# Patient Record
Sex: FEMALE | Race: WHITE | Employment: FULL TIME | URBAN - METROPOLITAN AREA
[De-identification: names, ages, dates, MRNs, and addresses within clinical notes are randomized per-mention and may not be internally consistent; named-entity substitution may affect disease eponyms.]

---

## 2018-06-14 PROBLEM — M25.562 ACUTE PAIN OF LEFT KNEE: Status: ACTIVE | Noted: 2018-06-14

## 2024-02-16 RX ORDER — ESCITALOPRAM OXALATE 20 MG/1
20 TABLET ORAL NIGHTLY
COMMUNITY

## 2024-02-19 NOTE — DISCHARGE INSTRUCTIONS
Home Care Instructions Following Your ACL Reconstruction     We hope you were pleased with your care at [facility]. We wish you the best outcome and overall experience with your operation. These instructions will help to minimize your pain and improve the likelihood of a successful result.    Weight bearing status  You will be 50% weight bearing on your operative extremity for the next 4 weeks.  You will use crutches for ambulation assistance while 50% weight bearing.  Crutches will be provided to you from the surgery center if you do not already have a pair    Brace instructions  Remain in your brace at all times for the next 4 weeks.  You may remove your brace for showering and physical therapy only.  Your brace is currently locked in extension (your leg is kept straight in your brace). You will remain locked in extension in your brace while ambulating for the first 2-4 weeks.    Bandages (Dressing)  Keep dressing clean and dry.  Dr. Gipson's office staff will remove your dressings at your first post-operative appointment.  Do not get your bandage wet.  You can shower with a plastic bag over your bandage. Keep bandage dry.    Wound Care  The following instructions will promote proper healing and help to prevent infection.  After our office staff has removed your bandage at your first post-operative appointment:   You can shower and get the incisions wet. Pat dry after your shower.  The white strips of tape covering your incisions are called steri-strips, and these will fall off after 1-2 weeks of showering. The steri-strips can get wet.  There are no sutures to remove, the sutures are all underneath the skin and dissolvable.  Do not submerge your knee in water (bath tub, hot tub, swimming pool, etc) until at least 4 weeks post operatively.     Cold Therapy  Cold therapy will help minimize swelling, improve, comfort, and limit the need for pain medication.  Apply an ice pack 2-3 times daily  The ice bag  should never come in direct contact with the skin, always have a towel or cloth in between the ice pack and your skin  Immediately contact Dr. Gipson if you experience excruciating pain not helped by pain medication, burning, blisters, redness, discoloration, or other changes in skin appearance.    Pain Medication: Norco  Norco: Take one or two tablets every six hours as needed for pain.  Do not exceed 8 (eight) tablets each day.  Do not take Norco, if you do not have pain.  You may take NSAIDs (Advil, Aleve) to assist with pain control if the Norco is not giving you maximum pain relief. It is okay to take NSAIDs with Norco.  You should not take Tylenol with Norco, as Norco already contains Tylenol, and too much Tylenol can be very dangerous for your body.    Deep Vein Thrombosis (DVT) Prophylaxis  You are at increased risk for developing a DVT or lower extremity blood clot after a lower extremity surgery.   Signs and symptoms of a DVT include calf swelling, calf pain, and calf tenderness to palpation.  If you experience severe calf pain, calf tenderness or calf swelling, you should go to the nearest urgent care or emergency room immediately for a STAT ultrasound to assess for a blood clot.  Keys to prevention of  DVT are performing ankle pumps (moving your ankle in an up and down motion) multiple times throughout the day for the first 1-2 weeks post-op.  To also help prevent a blood clot from developing, take 1 tablet of baby aspirin (81mg) daily for 4 weeks post operatively    Home Medication  Resume your home medications as instructed.    Diet  Resume your normal diet.    Activity  No strenuous activity. Again, you will be 50% weight bearing post-operatively for 4 weeks.   Use your crutches while 50% weight bearing and while using your brace.  You can go up and down the stairs as tolerated while in your brace. Use common sense.  You cannot return to work if your work requires strenuous activity with your legs.    You cannot return to any sports or exercises involving your legs until Dr. Gipson gives you permission.    Driving  Do not drive if you are taking pain medication. Do not drive while in the brace and using crutches.    Follow-up Appointment with Dr. Gipson  You were likely given a postoperative appointment with Dr. Gipson or Светлана WALLER at the time your operation was scheduled.  Call Dr. Gipson's office today to verify your appointment date, time, and location. You should be seen 2-5 days after your surgery. Call either [phone] for your appointment  At your 1st postoperative office visit: Your wounds will be evaluated, healing assessed, physical therapy will be ordered and scheduled and any additional concerns and instructions will be discussed.    Questions or Concerns  Call Dr. Gipson's office if you experience severe pain not controlled by pain medication, swelling, numbness, tingling, bleeding, fever, or other concerns.   If your call is made after office hours, a physician assistant or nurse practitioner will be available to help you. There is always a surgeon covering Dr. Gipson's patients, if he is unavailable.    Thank you for coming to [facility] for your operation. The nurses and the anesthesiologist try very hard to make sure you receive the best care possible.  Your trust in them is greatly appreciated.    Thanks so much,  Dr. Gipson       HOME INSTRUCTIONS  AMBSURG HOME CARE INSTRUCTIONS: POST-OP ANESTHESIA  The medication that you received for sedation or general anesthesia can last up to 24 hours. Your judgment and reflexes may be altered, even if you feel like your normal self.      We Recommend:   Do not drive any motor vehicle or bicycle   Avoid mowing the lawn, playing sports, or working with power tools/applicances (power saws, electric knives or mixers)   That you have someone stay with you on your first night home   Do not drink alcohol or take sleeping pills or  tranquilizers   Do not sign legal documents within 24 hours of your procedure   If you had a nerve block for your surgery, take extra care not to put any pressure on your arm or hand for 24 hours    It is normal:  For you to have a sore throat if you had a breathing tube during surgery (while you were asleep!). The sore throat should get better within 48 hours. You can gargle with warm salt water (1/2 tsp in 4 oz warm water) or use a throat lozenge for comfort  To feel muscle aches or soreness especially in the abdomen, chest or neck. The achy feeling should go away in the next 24 hours  To feel weak, sleepy or \"wiped out\". Your should start feeling better in the next 24 hours.   To experience mild discomforts such as sore lip or tongue, headache, cramps, gas pains or a bloated feeling in your abdomen.   To experience mild back pain or soreness for a day or two if you had spinal or epidural anesthesia.   If you had laparoscopic surgery, to feel shoulder pain or discomfort on the day of surgery.   For some patients to have nausea after surgery/anesthesia    If you feel nausea or experience vomiting:   Try to move around less.   Eat less than usual or drink only liquids until the next morning   Nausea should resolve in about 24 hours    If you have a problem when you are at home:    Call your surgeons office   Discharge Instructions: After Your Surgery  You’ve just had surgery. During surgery, you were given medicine called anesthesia to keep you relaxed and free of pain. After surgery, you may have some pain or nausea. This is common. Here are some tips for feeling better and getting well after surgery.   Going home  Your healthcare provider will show you how to take care of yourself when you go home. They'll also answer your questions. Have an adult family member or friend drive you home. For the first 24 hours after your surgery:   Don't drive or use heavy equipment.  Don't make important decisions or sign legal  papers.  Take medicines as directed.  Don't drink alcohol.  Have someone stay with you, if needed. They can watch for problems and help keep you safe.  Be sure to go to all follow-up visits with your healthcare provider. And rest after your surgery for as long as your provider tells you to.   Coping with pain  If you have pain after surgery, pain medicine will help you feel better. Take it as directed, before pain becomes severe. Also, ask your healthcare provider or pharmacist about other ways to control pain. This might be with heat, ice, or relaxation. And follow any other instructions your surgeon or nurse gives you.      Stay on schedule with your medicine.     Tips for taking pain medicine  To get the best relief possible, remember these points:   Pain medicines can upset your stomach. Taking them with a little food may help.  Most pain relievers taken by mouth need at least 20 to 30 minutes to start to work.  Don't wait till your pain becomes severe before you take your medicine. Try to time your medicine so that you can take it before starting an activity. This might be before you get dressed, go for a walk, or sit down for dinner.  Constipation is a common side effect of some pain medicines. Call your healthcare provider before taking any medicines such as laxatives or stool softeners to help ease constipation. Also ask if you should skip any foods. Drinking lots of fluids and eating foods such as fruits and vegetables that are high in fiber can also help. Remember, don't take laxatives unless your surgeon has prescribed them.  Drinking alcohol and taking pain medicine can cause dizziness and slow your breathing. It can even be deadly. Don't drink alcohol while taking pain medicine.  Pain medicine can make you react more slowly to things. Don't drive or run machinery while taking pain medicine.  Your healthcare provider may tell you to take acetaminophen to help ease your pain. Ask them how much you're  supposed to take each day. Acetaminophen or other pain relievers may interact with your prescription medicines or other over-the-counter (OTC) medicines. Some prescription medicines have acetaminophen and other ingredients in them. Using both prescription and OTC acetaminophen for pain can cause you to accidentally overdose. Read the labels on your OTC medicines with care. This will help you to clearly know the list of ingredients, how much to take, and any warnings. It may also help you not take too much acetaminophen. If you have questions or don't understand the information, ask your pharmacist or healthcare provider to explain it to you before you take the OTC medicine.   Managing nausea  Some people have an upset stomach (nausea) after surgery. This is often because of anesthesia, pain, or pain medicine, less movement of food in the stomach, or the stress of surgery. These tips will help you handle nausea and eat healthy foods as you get better. If you were on a special food plan before surgery, ask your healthcare provider if you should follow it while you get better. Check with your provider on how your eating should progress. It may depend on the surgery you had. These general tips may help:   Don't push yourself to eat. Your body will tell you when to eat and how much.  Start off with clear liquids and soup. They're easier to digest.  Next try semi-solid foods as you feel ready. These include mashed potatoes, applesauce, and gelatin.  Slowly move to solid foods. Don’t eat fatty, rich, or spicy foods at first.  Don't force yourself to have 3 large meals a day. Instead eat smaller amounts more often.  Take pain medicines with a small amount of solid food, such as crackers or toast. This helps prevent nausea.  When to call your healthcare provider  Call your healthcare provider right away if you have any of these:   You still have too much pain, or the pain gets worse, after taking the medicine. The medicine  may not be strong enough. Or there may be a complication from the surgery.  You feel too sleepy, dizzy, or groggy. The medicine may be too strong.  Side effects such as nausea or vomiting. Your healthcare provider may advise taking other medicines to .  Skin changes such as rash, itching, or hives. This may mean you have an allergic reaction. Your provider may advise taking other medicines.  The incision looks different (for instance, part of it opens up).  Bleeding or fluid leaking from the incision site, and weren't told to expect that.  Fever of 100.4°F (38°C) or higher, or as directed by your provider.  Call 911  Call 911 right away if you have:   Trouble breathing  Facial swelling    If you have obstructive sleep apnea   You were given anesthesia medicine during surgery to keep you comfortable and free of pain. After surgery, you may have more apnea spells because of this medicine and other medicines you were given. The spells may last longer than normal.    At home:  Keep using the continuous positive airway pressure (CPAP) device when you sleep. Unless your healthcare provider tells you not to, use it when you sleep, day or night. CPAP is a common device used to treat obstructive sleep apnea.  Talk with your provider before taking any pain medicine, muscle relaxants, or sedatives. Your provider will tell you about the possible dangers of taking these medicines.  Contact your provider if your sleeping changes a lot even when taking medicines as directed.  Carl last reviewed this educational content on 10/1/2021  © 6186-1521 [employer]. All rights reserved. This information is not intended as a substitute for professional medical care. Always follow your healthcare professional's instructions.

## 2024-02-20 ENCOUNTER — ANESTHESIA EVENT (OUTPATIENT)
Dept: SURGERY | Facility: HOSPITAL | Age: 29
End: 2024-02-20
Payer: OTHER MISCELLANEOUS

## 2024-02-21 ENCOUNTER — HOSPITAL ENCOUNTER (OUTPATIENT)
Facility: HOSPITAL | Age: 29
Setting detail: HOSPITAL OUTPATIENT SURGERY
Discharge: HOME | End: 2024-02-21
Attending: ORTHOPAEDIC SURGERY | Admitting: ORTHOPAEDIC SURGERY
Payer: OTHER MISCELLANEOUS

## 2024-02-21 ENCOUNTER — APPOINTMENT (OUTPATIENT)
Dept: GENERAL RADIOLOGY | Facility: HOSPITAL | Age: 29
End: 2024-02-21
Attending: ORTHOPAEDIC SURGERY
Payer: OTHER MISCELLANEOUS

## 2024-02-21 ENCOUNTER — ANESTHESIA (OUTPATIENT)
Dept: SURGERY | Facility: HOSPITAL | Age: 29
End: 2024-02-21
Payer: OTHER MISCELLANEOUS

## 2024-02-21 VITALS
DIASTOLIC BLOOD PRESSURE: 66 MMHG | RESPIRATION RATE: 16 BRPM | TEMPERATURE: 98 F | HEIGHT: 65.5 IN | HEART RATE: 66 BPM | WEIGHT: 285 LBS | OXYGEN SATURATION: 100 % | SYSTOLIC BLOOD PRESSURE: 129 MMHG | BODY MASS INDEX: 46.91 KG/M2

## 2024-02-21 DIAGNOSIS — S83.502D SPRAIN OF CRUCIATE LIGAMENT OF LEFT KNEE, SUBSEQUENT ENCOUNTER: Primary | ICD-10-CM

## 2024-02-21 LAB — B-HCG UR QL: NEGATIVE

## 2024-02-21 PROCEDURE — 81025 URINE PREGNANCY TEST: CPT

## 2024-02-21 PROCEDURE — 0SBD4ZZ EXCISION OF LEFT KNEE JOINT, PERCUTANEOUS ENDOSCOPIC APPROACH: ICD-10-PCS | Performed by: ORTHOPAEDIC SURGERY

## 2024-02-21 PROCEDURE — 64447 NJX AA&/STRD FEMORAL NRV IMG: CPT | Performed by: ORTHOPAEDIC SURGERY

## 2024-02-21 PROCEDURE — 0MRP4KZ REPLACEMENT OF LEFT KNEE BURSA AND LIGAMENT WITH NONAUTOLOGOUS TISSUE SUBSTITUTE, PERCUTANEOUS ENDOSCOPIC APPROACH: ICD-10-PCS | Performed by: ORTHOPAEDIC SURGERY

## 2024-02-21 PROCEDURE — 76000 FLUOROSCOPY <1 HR PHYS/QHP: CPT | Performed by: ORTHOPAEDIC SURGERY

## 2024-02-21 PROCEDURE — 0SQD4ZZ REPAIR LEFT KNEE JOINT, PERCUTANEOUS ENDOSCOPIC APPROACH: ICD-10-PCS | Performed by: ORTHOPAEDIC SURGERY

## 2024-02-21 DEVICE — IMPLANTABLE DEVICE: Type: IMPLANTABLE DEVICE | Site: KNEE | Status: FUNCTIONAL

## 2024-02-21 DEVICE — SCREW INTFR L30MM DIA10MM VENT BIOCOMPOSITE: Type: IMPLANTABLE DEVICE | Site: KNEE | Status: FUNCTIONAL

## 2024-02-21 RX ORDER — MIDAZOLAM HYDROCHLORIDE 1 MG/ML
INJECTION INTRAMUSCULAR; INTRAVENOUS
Status: COMPLETED | OUTPATIENT
Start: 2024-02-21 | End: 2024-02-21

## 2024-02-21 RX ORDER — HYDROMORPHONE HYDROCHLORIDE 1 MG/ML
0.2 INJECTION, SOLUTION INTRAMUSCULAR; INTRAVENOUS; SUBCUTANEOUS EVERY 5 MIN PRN
Status: DISCONTINUED | OUTPATIENT
Start: 2024-02-21 | End: 2024-02-21

## 2024-02-21 RX ORDER — ONDANSETRON 2 MG/ML
INJECTION INTRAMUSCULAR; INTRAVENOUS AS NEEDED
Status: DISCONTINUED | OUTPATIENT
Start: 2024-02-21 | End: 2024-02-21 | Stop reason: SURG

## 2024-02-21 RX ORDER — ASPIRIN 81 MG/1
81 TABLET ORAL DAILY
Qty: 30 TABLET | Refills: 0 | Status: SHIPPED | OUTPATIENT
Start: 2024-02-21

## 2024-02-21 RX ORDER — CEFAZOLIN SODIUM/WATER 2 G/20 ML
SYRINGE (ML) INTRAVENOUS AS NEEDED
Status: DISCONTINUED | OUTPATIENT
Start: 2024-02-21 | End: 2024-02-21 | Stop reason: SURG

## 2024-02-21 RX ORDER — BUPIVACAINE HYDROCHLORIDE AND EPINEPHRINE 2.5; 5 MG/ML; UG/ML
INJECTION, SOLUTION INFILTRATION; PERINEURAL AS NEEDED
Status: DISCONTINUED | OUTPATIENT
Start: 2024-02-21 | End: 2024-02-21 | Stop reason: HOSPADM

## 2024-02-21 RX ORDER — GLYCOPYRROLATE 0.2 MG/ML
INJECTION, SOLUTION INTRAMUSCULAR; INTRAVENOUS AS NEEDED
Status: DISCONTINUED | OUTPATIENT
Start: 2024-02-21 | End: 2024-02-21 | Stop reason: SURG

## 2024-02-21 RX ORDER — HYDROCODONE BITARTRATE AND ACETAMINOPHEN 5; 325 MG/1; MG/1
1 TABLET ORAL ONCE
Status: COMPLETED | OUTPATIENT
Start: 2024-02-21 | End: 2024-02-21

## 2024-02-21 RX ORDER — CEFAZOLIN SODIUM IN 0.9 % NACL 3 G/100 ML
3 INTRAVENOUS SOLUTION, PIGGYBACK (ML) INTRAVENOUS ONCE
Status: DISCONTINUED | OUTPATIENT
Start: 2024-02-21 | End: 2024-02-21 | Stop reason: HOSPADM

## 2024-02-21 RX ORDER — IBUPROFEN 600 MG/1
600 TABLET ORAL EVERY 8 HOURS PRN
Qty: 30 TABLET | Refills: 1 | Status: SHIPPED | OUTPATIENT
Start: 2024-02-21

## 2024-02-21 RX ORDER — DEXAMETHASONE SODIUM PHOSPHATE 4 MG/ML
VIAL (ML) INJECTION AS NEEDED
Status: DISCONTINUED | OUTPATIENT
Start: 2024-02-21 | End: 2024-02-21 | Stop reason: SURG

## 2024-02-21 RX ORDER — MORPHINE SULFATE 4 MG/ML
2 INJECTION, SOLUTION INTRAMUSCULAR; INTRAVENOUS EVERY 10 MIN PRN
Status: DISCONTINUED | OUTPATIENT
Start: 2024-02-21 | End: 2024-02-21

## 2024-02-21 RX ORDER — FAMOTIDINE 20 MG/1
20 TABLET, FILM COATED ORAL ONCE
Status: COMPLETED | OUTPATIENT
Start: 2024-02-21 | End: 2024-02-21

## 2024-02-21 RX ORDER — SODIUM CHLORIDE, SODIUM LACTATE, POTASSIUM CHLORIDE, CALCIUM CHLORIDE 600; 310; 30; 20 MG/100ML; MG/100ML; MG/100ML; MG/100ML
INJECTION, SOLUTION INTRAVENOUS CONTINUOUS
Status: DISCONTINUED | OUTPATIENT
Start: 2024-02-21 | End: 2024-02-21

## 2024-02-21 RX ORDER — MORPHINE SULFATE 4 MG/ML
4 INJECTION, SOLUTION INTRAMUSCULAR; INTRAVENOUS EVERY 10 MIN PRN
Status: DISCONTINUED | OUTPATIENT
Start: 2024-02-21 | End: 2024-02-21

## 2024-02-21 RX ORDER — DEXAMETHASONE SODIUM PHOSPHATE 10 MG/ML
INJECTION, SOLUTION INTRAMUSCULAR; INTRAVENOUS
Status: COMPLETED | OUTPATIENT
Start: 2024-02-21 | End: 2024-02-21

## 2024-02-21 RX ORDER — HYDROMORPHONE HYDROCHLORIDE 1 MG/ML
0.4 INJECTION, SOLUTION INTRAMUSCULAR; INTRAVENOUS; SUBCUTANEOUS EVERY 5 MIN PRN
Status: DISCONTINUED | OUTPATIENT
Start: 2024-02-21 | End: 2024-02-21

## 2024-02-21 RX ORDER — ONDANSETRON 2 MG/ML
4 INJECTION INTRAMUSCULAR; INTRAVENOUS EVERY 6 HOURS PRN
Status: DISCONTINUED | OUTPATIENT
Start: 2024-02-21 | End: 2024-02-21

## 2024-02-21 RX ORDER — LIDOCAINE HYDROCHLORIDE 10 MG/ML
INJECTION, SOLUTION INFILTRATION; PERINEURAL
Status: COMPLETED | OUTPATIENT
Start: 2024-02-21 | End: 2024-02-21

## 2024-02-21 RX ORDER — MORPHINE SULFATE 10 MG/ML
6 INJECTION, SOLUTION INTRAMUSCULAR; INTRAVENOUS EVERY 10 MIN PRN
Status: DISCONTINUED | OUTPATIENT
Start: 2024-02-21 | End: 2024-02-21

## 2024-02-21 RX ORDER — NALOXONE HYDROCHLORIDE 0.4 MG/ML
0.08 INJECTION, SOLUTION INTRAMUSCULAR; INTRAVENOUS; SUBCUTANEOUS AS NEEDED
Status: DISCONTINUED | OUTPATIENT
Start: 2024-02-21 | End: 2024-02-21

## 2024-02-21 RX ORDER — ACETAMINOPHEN 500 MG
1000 TABLET ORAL ONCE
Status: COMPLETED | OUTPATIENT
Start: 2024-02-21 | End: 2024-02-21

## 2024-02-21 RX ORDER — ROPIVACAINE HYDROCHLORIDE 5 MG/ML
INJECTION, SOLUTION EPIDURAL; INFILTRATION; PERINEURAL
Status: COMPLETED | OUTPATIENT
Start: 2024-02-21 | End: 2024-02-21

## 2024-02-21 RX ORDER — KETOROLAC TROMETHAMINE 30 MG/ML
INJECTION, SOLUTION INTRAMUSCULAR; INTRAVENOUS AS NEEDED
Status: DISCONTINUED | OUTPATIENT
Start: 2024-02-21 | End: 2024-02-21 | Stop reason: SURG

## 2024-02-21 RX ORDER — FAMOTIDINE 10 MG/ML
20 INJECTION, SOLUTION INTRAVENOUS ONCE
Status: COMPLETED | OUTPATIENT
Start: 2024-02-21 | End: 2024-02-21

## 2024-02-21 RX ORDER — HYDROMORPHONE HYDROCHLORIDE 1 MG/ML
0.6 INJECTION, SOLUTION INTRAMUSCULAR; INTRAVENOUS; SUBCUTANEOUS EVERY 5 MIN PRN
Status: DISCONTINUED | OUTPATIENT
Start: 2024-02-21 | End: 2024-02-21

## 2024-02-21 RX ORDER — HYDROCODONE BITARTRATE AND ACETAMINOPHEN 5; 325 MG/1; MG/1
1 TABLET ORAL EVERY 6 HOURS PRN
Qty: 28 TABLET | Refills: 0 | Status: SHIPPED | OUTPATIENT
Start: 2024-02-21

## 2024-02-21 RX ADMIN — DEXAMETHASONE SODIUM PHOSPHATE 4 MG: 4 MG/ML VIAL (ML) INJECTION at 07:40:00

## 2024-02-21 RX ADMIN — SODIUM CHLORIDE, SODIUM LACTATE, POTASSIUM CHLORIDE, CALCIUM CHLORIDE: 600; 310; 30; 20 INJECTION, SOLUTION INTRAVENOUS at 10:48:00

## 2024-02-21 RX ADMIN — ROPIVACAINE HYDROCHLORIDE 30 ML: 5 INJECTION, SOLUTION EPIDURAL; INFILTRATION; PERINEURAL at 07:21:00

## 2024-02-21 RX ADMIN — GLYCOPYRROLATE 0.2 MG: 0.2 INJECTION, SOLUTION INTRAMUSCULAR; INTRAVENOUS at 07:40:00

## 2024-02-21 RX ADMIN — CEFAZOLIN SODIUM/WATER 3 G: 2 G/20 ML SYRINGE (ML) INTRAVENOUS at 07:34:00

## 2024-02-21 RX ADMIN — DEXAMETHASONE SODIUM PHOSPHATE 10 MG: 10 INJECTION, SOLUTION INTRAMUSCULAR; INTRAVENOUS at 07:21:00

## 2024-02-21 RX ADMIN — KETOROLAC TROMETHAMINE 30 MG: 30 INJECTION, SOLUTION INTRAMUSCULAR; INTRAVENOUS at 10:39:00

## 2024-02-21 RX ADMIN — ONDANSETRON 4 MG: 2 INJECTION INTRAMUSCULAR; INTRAVENOUS at 07:40:00

## 2024-02-21 RX ADMIN — LIDOCAINE HYDROCHLORIDE 5 ML: 10 INJECTION, SOLUTION INFILTRATION; PERINEURAL at 07:21:00

## 2024-02-21 RX ADMIN — MIDAZOLAM HYDROCHLORIDE 2 MG: 1 INJECTION INTRAMUSCULAR; INTRAVENOUS at 07:21:00

## 2024-02-21 NOTE — H&P
History and Physical     Liliana Morrow Patient Status:  Hospital Outpatient Surgery    1995 MRN [id number]   Location [facility] POST ANESTHESIA CARE UNIT Attending Liborio Gipson MD   Hosp Day # 0 PCP No primary care provider on file.     Chief Complaint: left knee instability    Subjective:    Liliana Morrow is a 28 year old female with that sustained left knee ACL graft tear. She has instability. Presents today for L knee ACL revision and LET.     History/Other:      Past Medical History:  Past Medical History:   Diagnosis Date    Anxiety state     Chronic rhinitis     Depression     Hx of motion sickness     Visual impairment         Past Surgical History:   Past Surgical History:   Procedure Laterality Date    KNEE SCOPE,AID ANT CRUCIATE REPAIR Left 2015    Procedure: ARTHROSCOPY KNEE LEFT;  Surgeon: Liborio Gipson MD;  Location: [facility]    KNEE SCOPE,MED+LAT MENIS REPAIR Left 2015    Procedure: ARTHROSCOPY KNEE ANTERIOR CRUCIATE LIGAMENT RECONSTRUCTION;  Surgeon: Liborio Gipson MD;  Location: [facility]    KNEE SURGERY Left 2018    knee arthroscopy with ACL reconstruction/PMM       Social History:  reports that she has never smoked. She has never used smokeless tobacco. She reports current alcohol use. She reports that she does not use drugs.    Family History:   Family History   Problem Relation Age of Onset    Lipids Father     Hypertension Father     Lipids Mother     Hypertension Mother     Diabetes Mother     Heart Disease Maternal Grandmother        Allergies: No Known Allergies    Medications:    No current facility-administered medications on file prior to encounter.     Current Outpatient Medications on File Prior to Encounter   Medication Sig Dispense Refill    escitalopram 20 MG Oral Tab Take 1 tablet (20 mg total) by mouth at bedtime.      Melatonin 2.5 MG Oral Chew Tab Chew by mouth nightly.      Levocetirizine Dihydrochloride  2.5 MG/5ML Oral Solution Take 5 mL (2.5 mg total) by mouth every evening.         Review of Systems:   A comprehensive 14 point review of systems was completed.    Pertinent positives and negatives noted in the HPI.    Objective:     /82   Pulse 69   Temp 98.1 °F (36.7 °C) (Oral)   Resp 20   Ht 5' 5.5\" (1.664 m)   Wt 285 lb (129.3 kg)   LMP 01/24/2024   SpO2 96%   BMI 46.71 kg/m²   General: No acute distress.  Alert ,      , morbidly obese  HEENT: Normocephalic atraumatic. Moist mucous membranes. EOM-I. PERRLA. Anicteric.  Neck: No lymphadenopathy. No JVD. No carotid bruits.  Respiratory: Clear to auscultation bilaterally. No wheezes. No rhonchi.    Cardiovascular: S1, S2. Regular rate and rhythm. No murmurs, rubs or gallops. Equal pulses.   Chest and Back: No tenderness or deformity.  Abdomen: Soft, nontender, nondistended.  Positive bowel sounds. No rebound, guarding or organomegaly.  Neurologic: No focal neurological deficits. CNII-XII grossly intact.  Musculoskeletal: Moves all extremities.  Psychiatric: Appropriate mood and affect.  Integument: No rashes or lesions.   LLE:  +Lachman, +Pivot  -Rose's      Assessment & Plan:    28F that sustained left ACL graft tear.   To OR today for L ACL revision recon with allograft and LET.    Quality:  DVT Mechanical Prophylaxis:        DVT Pharmacologic Prophylaxis   Medication   None                Code Status: Not on file  Titus: No urinary catheter in place  Titus Duration (in days):   Central line:    SIMONA:       Liborio Gipson MD  2/21/2024

## 2024-02-21 NOTE — ANESTHESIA PROCEDURE NOTES
Airway  Date/Time: 2/21/2024 7:41 AM  Urgency: elective    Airway not difficult    General Information and Staff    Patient location during procedure: OR  Anesthesiologist: Xavi Esparza MD  Performed: anesthesiologist   Performed by: Xavi Esparza MD  Authorized by: Xavi Esparza MD      Indications and Patient Condition  Indications for airway management: anesthesia  Spontaneous Ventilation: absent  Sedation level: deep  Preoxygenated: yes  Patient position: sniffing  Mask difficulty assessment: 1 - vent by mask  Planned trial extubation    Final Airway Details  Final airway type: supraglottic airway      Successful airway: classic  Size 4       Number of attempts at approach: 1  Number of other approaches attempted: 0

## 2024-02-21 NOTE — ANESTHESIA POSTPROCEDURE EVALUATION
Patient: Liliana Morrow    Procedure Summary       Date: 02/21/24 Room / Location: [facility] MAIN OR  / [facility] MAIN OR    Anesthesia Start: 0733 Anesthesia Stop:     Procedure: Left knee arthroscopy, anterior cruciate ligament revision reconstruction with tibialis anterior allograft, partial medial meniscectomy, lateral extra-articular tenodesis (Left: Knee) Diagnosis: (Acute medial meniscus tear left, initial encounter, tear of anterior cruciate ligament graft, initial encounter)    Surgeons: Liborio Gipson MD Anesthesiologist: Angela Lugo MD    Anesthesia Type: general, regional ASA Status: 3            Anesthesia Type: No value filed.    Vitals Value Taken Time   /89 02/21/24 1047   Temp 99 °F (37.2 °C) 02/21/24 1047   Pulse 97 02/21/24 1047   Resp 20 02/21/24 1047   SpO2 98 % 02/21/24 1047   Vitals shown include unfiled device data.    EM AN Post Evaluation:   Patient Evaluated in PACU  Patient Participation: complete - patient participated  Level of Consciousness: awake  Pain Score: 3  Pain Management: adequate  Airway Patency:patent  Dental exam unchanged from preop  Yes    Cardiovascular Status: acceptable  Respiratory Status: acceptable  Postoperative Hydration acceptable      ANGELA LUGO MD  2/21/2024 10:48 AM

## 2024-02-21 NOTE — ANESTHESIA PREPROCEDURE EVALUATION
Anesthesia PreOp Note    HPI:     Liliana Morrow is a 28 year old female who presents for preoperative consultation requested by: Liborio Gipson MD    Date of Surgery: 2/21/2024    Procedure(s):  Left knee arthroscopy, anterior cruciate ligament revision reconstruction with tibialis anterior allograft, partial medial meniscectomy, lateral extra-articular tenodesis  Indication: Acute medial meniscus tear left, initial encounter, tear of anterior cruciate ligament graft, initial encounter    Relevant Problems   No relevant active problems       NPO:  Last Liquid Consumption Date: 02/20/24  Last Liquid Consumption Time: 2000  Last Solid Consumption Date: 02/20/24  Last Solid Consumption Time: 2000  Last Liquid Consumption Date: 02/20/24          History Review:  Patient Active Problem List    Diagnosis Date Noted    Acute pain of left knee 06/14/2018    Sprain of cruciate ligament of left knee, subsequent encounter 10/11/2015    Sprain of cruciate ligament of knee, left, subsequent encounter 10/01/2015    Sprain of cruciate ligament of knee 01/12/2015    SX.1/28/15, CPT.79246, L Knee Scope/MMR/PMM/PLM/LMR/ACL Reconstruction, GHD, Global Exp.4/27/15 01/12/2015    Tear of lateral cartilage or meniscus of knee, current 01/12/2015    Sprain of calcaneofibular (ligament) of ankle 12/23/2013       Past Medical History:   Diagnosis Date    Anxiety state     Chronic rhinitis     Depression     Hx of motion sickness     Visual impairment        Past Surgical History:   Procedure Laterality Date    KNEE SCOPE,AID ANT CRUCIATE REPAIR Left 1/28/2015    Procedure: ARTHROSCOPY KNEE LEFT;  Surgeon: Liborio Gipson MD;  Location: [facility]    KNEE SCOPE,MED+LAT MENIS REPAIR Left 1/28/2015    Procedure: ARTHROSCOPY KNEE ANTERIOR CRUCIATE LIGAMENT RECONSTRUCTION;  Surgeon: Liborio Gipson MD;  Location: [facility]    KNEE SURGERY Left 06/04/2018    knee arthroscopy with ACL reconstruction/PMM        Medications Prior to Admission   Medication Sig Dispense Refill Last Dose    escitalopram 20 MG Oral Tab Take 1 tablet (20 mg total) by mouth at bedtime.   2/20/2024 at 2000    Melatonin 2.5 MG Oral Chew Tab Chew by mouth nightly.   2/20/2024 at 2000    Levocetirizine Dihydrochloride 2.5 MG/5ML Oral Solution Take 5 mL (2.5 mg total) by mouth every evening.   2/20/2024 at 2000     Current Facility-Administered Medications Ordered in Epic   Medication Dose Route Frequency Provider Last Rate Last Admin    lactated ringers infusion   Intravenous Continuous Liborio Gipson MD 20 mL/hr at 02/21/24 0626 New Bag at 02/21/24 0626    ceFAZolin (Ancef) 3 g in sodium chloride 0.9% 100mL IVPB premix  3 g Intravenous Once Liborio Gipson MD         No current [facility]-ordered outpatient medications on file.       No Known Allergies    Family History   Problem Relation Age of Onset    Lipids Father     Hypertension Father     Lipids Mother     Hypertension Mother     Diabetes Mother     Heart Disease Maternal Grandmother      Social History     Socioeconomic History    Marital status: Single   Tobacco Use    Smoking status: Never    Smokeless tobacco: Never   Substance and Sexual Activity    Alcohol use: Yes    Drug use: No       Available pre-op labs reviewed.  Lab Results   Component Value Date    URINEPREG Negative 02/21/2024             Vital Signs:  Body mass index is 46.71 kg/m².   height is 1.664 m (5' 5.5\") and weight is 129.3 kg (285 lb). Her oral temperature is 98.1 °F (36.7 °C). Her blood pressure is 126/87 and her pulse is 79. Her respiration is 19 and oxygen saturation is 97%.   Vitals:    02/16/24 1017 02/21/24 0607 02/21/24 0650 02/21/24 0700   BP:  137/77 131/89 126/87   Pulse:  78 72 79   Resp:  18 16 19   Temp:  98.1 °F (36.7 °C)     TempSrc:  Oral     SpO2:  96% 96% 97%   Weight: 129.3 kg (285 lb) 129.3 kg (285 lb)     Height: 1.664 m (5' 5.5\") 1.664 m (5' 5.5\")          Anesthesia Evaluation      Patient summary reviewed and Nursing notes reviewed    Airway   Mallampati: II  TM distance: >3 FB  Neck ROM: full  Dental - Dentition appears grossly intact     Pulmonary - negative ROS and normal exam   Cardiovascular - negative ROS and normal exam    Neuro/Psych    (+)  anxiety/panic attacks,  depression      GI/Hepatic/Renal - negative ROS     Endo/Other      Comments: Morbid obesity,  L ACL graft damage  Abdominal  - normal exam                 Anesthesia Plan:   ASA:  3  Plan:   General and regional  Airway:  LMA  Post-op Pain Management: Saphenous block  Informed Consent Plan and Risks Discussed With:  Patient  Discussed plan with:  Surgeon      I have informed Liliana Morrow and/or legal guardian or family member of the nature of the anesthetic plan, benefits, risks including possible dental damage if relevant, major complications, and any alternative forms of anesthetic management.   All of the patient's questions were answered to the best of my ability. The patient desires the anesthetic management as planned.  ANGELA LUGO MD  2/21/2024 7:04 AM  Present on Admission:  **None**

## 2024-02-21 NOTE — OPERATIVE REPORT
[facility]    PATIENT'S NAME: SHALINI BALL   ATTENDING PHYSICIAN: Liborio Gipson MD   OPERATING PHYSICIAN: Liborio Gipson MD   PATIENT ACCOUNT#:   [id number]    LOCATION:  Critical access hospital PACU 9 [facility]  MEDICAL RECORD #:   [id number]       YOB: 1995  ADMISSION DATE:       02/21/2024      OPERATION DATE:  02/21/2024    OPERATIVE REPORT      PREOPERATIVE DIAGNOSIS:  Left knee anterior cruciate ligament graft tear and medial meniscus tear.  POSTOPERATIVE DIAGNOSIS:  Left knee anterior cruciate ligament graft tear and medial meniscus tear.  PROCEDURE:  Left knee arthroscopy, anterior cruciate ligament revision and reconstruction with Achilles tendon allograft, partial medial meniscectomy and lateral extraarticular tenodesis, intraoperative use of fluoroscopy.   Modifier 22 will be added to the case secondary to the patient's BMI of 46 as well as the fact that this was a revision ACL.  This was the third time an ACL graft was placed in the patient's knee and, therefore, it was technically complex and was more difficult than a normal ACL reconstruction.     ASSISTANT:  Светлана Galvez PA-C.  Note, Ms. Lenny PA-C, was essential to the case for preoperative patient positioning, draping, assistance with preparation of the graft, passage of the graft, as well as soft tissue retraction, closure of the wound, and placement of the dressing and brace.    INDICATIONS:  This is a pleasant 28-year-old female who sustained a left knee ACL graft tear.  She also sustained a medial meniscus tear.  She desired surgery.  I had a long discussion with the patient regarding the risks, benefits, and alternatives of surgical and nonsurgical intervention.  The risk of surgery include, but are not limited to, infection, bleeding, neurovascular injury, pain and stiffness after surgery, need for surgery in the future, development of deep vein thrombosis, pulmonary emboli, and anesthetic compromise.  The  patient agreed to proceed with surgical intervention after the above risks were discussed in great detail.    OPERATIVE TECHNIQUE:  After the patient's informed consent was obtained, the patient's left knee was marked in the preoperative holding area.  The patient was brought back to the operating room.  She was given an adductor canal block by the anesthesia service.  Left lower extremity was prepped and draped in standard surgical fashion.  Perioperative antibiotics were infused.  Confirmation of identity and laterality took place.  Time-out was performed.  Anterolateral portal was made to the anterior aspect of the knee.  Diagnostic arthroscopy then ensued.  There was no significant articular cartilage damage in patellofemoral compartment.  There were no loose bodies in the medial or lateral gutter.  The medial compartment was entered, and there was a tear of the posterior horn of medial meniscus.  There was a previous bucket-handle tear of the medial meniscus that was excised in 2018.  However, the patient did have some tearing of the posterior horn of the medial meniscus, and this was debrided back to a stable rim with motorized shaver and meniscal biter.  There was no significant articular cartilage damage in the medial compartment.  The ACL graft was identified in the intercondylar notch and was torn.  The lateral compartment was entered.  There was a positive drive-through sign in the lateral compartment.  There was an incomplete tear of the posterior horn of the lateral meniscus.  At this time, attention was directed to the intercondylar notch.  The ACL graft was taken back to its femoral and tibial footprints respectively.  Ms. Galvez was preparing the graft while I did drill my femoral tunnel.  The knee was flexed and a 7 mm backwall protector was placed.  The guidewire was then drilled out the distal lateral thigh and overreamed to 10 mm to a depth of 30 mm.  A #2 FiberWire was placed in the femoral  tunnel in order to aid in graft passage in later portion of the case.  Attention was then directed to the tibial tunnel.  A tibial aiming guide set at 62 degrees was placed at the tibial footprint of the ACL as well as the proximal medial tibia.  Drill pin was drilled and overreamed with a 10 mm reamer.  Once this was done, the allograft that had been prepared by Ms. Galvez was brought up from the back table and passed retrograde through the tibial tunnel into the femoral tunnel.  When it was well seated, a Nitinol wire was placed into the femoral tunnel.  The tunnel was tapped with 7 mm tap, and a 9 x 20 mm interference screw was placed.  The last 2 threads of the screw did break; however, there was excellent fixation of the graft at that time.  Nitinol wire was then placed into the tibial tunnel, and a 10 x 30 mm BioComposite screw was placed with the knee in full extension.  Once this was placed, gentle Lachman was performed and found to be intact.  Attention was then directed to the lateral aspect of the knee.  All arthroscopic instrumentation was taken out of the knee at this time.  An incision from Gerdy's tubercle down the lateral aspect of the thigh was made.  Incision was carried sharply through subcutaneous tissue to fascia.  The IT band was identified.  The lateral epicondyle was palpated as well as the FCL.  A 1 cm in width x 4 cm in length strip of the posterior third of the IT band was then harvested and whipstitched.  The FCL was then identified.  A 90-degree snap was placed underneath the FCL going from proximal to distal.  The IT band was then passed from distal to proximal underneath the FCL.  A 2.6 mm knotless anchor was then placed just proximal and anterior to the lateral epicondyle.  Intraoperative fluoroscopy was used to make sure that the direction of the planned tunnel did not collide with the femoral tunnel of the ACL graft.  Once appropriate placement of the guide was set, the tunnel  was drilled for the anchor.  The anchor was deployed.  The graft was taken through both loops of the anchor.  One loop of the anchor was then tightened.  The graft was then flipped and passed through the second suture and then tightened again thereby performing the lateral extraarticular tenodesis.  Excess graft was cut.  The wound was thoroughly irrigated.  The scope was placed back into the knee, and there was a negative drive-through sign in the lateral compartment as opposed to a positive drive-through sign that was seen at the earlier part of the case before the extraarticular tenodesis was performed.  At this time, the wound was thoroughly irrigated and closed in layers.  The IT band was closed with #1 Vicryl.  Subcutaneous tissue was closed 2-0 Vicryl and skin was closed with a Monocryl suture.  The rest of the incisions were closed with 2-0 Vicryl suture and Monocryl suture.  Bulky dressing was placed to the left knee.  The patient was extubated successfully and transferred to the recovery room in stable condition.    DISPOSITION:  The patient is 50% weightbearing on the left lower extremity.  She will follow up in 5 days for her first postoperative appointment.  She will then begin a course of physical therapy.  She will call our office with any questions or concerns.  She was placed into a postop hinged knee brace locked in extension.      Dictated By Liborio Gipson MD  d: 02/21/2024 10:41:01  t: 02/21/2024 10:52:19  [facility] [id number]  GD/

## 2024-02-21 NOTE — ANESTHESIA PROCEDURE NOTES
Peripheral Block    Date/Time: 2/21/2024 7:21 AM    Performed by: Xavi Esparza MD  Authorized by: Xavi Esparza MD      General Information and Staff    Start Time:  2/21/2024 7:21 AM  End Time:  2/21/2024 7:30 AM  Anesthesiologist:  Xavi Esparza MD  Performed by:  Anesthesiologist  Patient Location:  PACU    Block Placement: Pre Induction  Site Identification: real time ultrasound guided, surface landmarks and image stored and retrievable    Block site/laterality marked before start: site marked  Reason for Block: at surgeon's request, post-op pain management and surgical anesthesia    Preanesthetic Checklist: 2 patient identifers, IV checked, risks and benefits discussed, monitors and equipment checked, pre-op evaluation, timeout performed, anesthesia consent, sterile technique used, no prohibitive neurological deficits and no local skin infection at insertion site      Procedure Details    Patient Position:  Supine  Prep: ChloraPrep and patient draped    Monitoring:  Heart rate, cardiac monitor, continuous pulse ox and blood pressure cuff  Block Type:  Saphenous  Laterality:  Left  Injection Technique:  Single-shot    Needle    Needle Type:  Echogenic  Needle Gauge:  22 G  Needle Length:  90 mm  Needle Localization:  Anatomical landmarks and ultrasound guidance  Reason for Ultrasound Use: appropriate spread of the medication was noted in real time and no ultrasound evidence of intravascular and/or intraneural injection          Needle Insertion Depth:  6.5    Assessment    Injection Assessment:  Good spread noted, incremental injection, local visualized surrounding nerve on ultrasound, low pressure, negative aspiration for heme, negative resistance and no pain on injection  Paresthesia Pain:  None  Heart Rate Change: No    - Patient tolerated block procedure well without evidence of immediate block related complications.     Medications  2/21/2024 7:21 AM  midazolam (VERSED)injection  2mg/2ml - Intravenous   2 mg - 2/21/2024 7:21:00 AM  lidocaine injection 1% - Intradermal   5 mL - 2/21/2024 7:21:00 AM  ropivacaine (NAROPIN) injection 0.5% - Infiltration   30 mL - 2/21/2024 7:21:00 AM  dexamethasone (DECADRON) PF injection 10 mg/ml - Injection   10 mg - 2/21/2024 7:21:00 AM    Additional Comments

## (undated) DEVICE — PADDING CAST 4IN X 4YD UNDERCAST WHT 100% COT

## (undated) DEVICE — APPLICATOR SKIN PREP 26ML HI LT ORNG 2% CHG

## (undated) DEVICE — SUTURE MCRYL 4-0 18IN ABSRB UD 19MM PS-2 3/8

## (undated) DEVICE — PADDING CAST W4INXL4YD WHT COT REG FNSH

## (undated) DEVICE — Device

## (undated) DEVICE — BUR SHV L13CM DIA4MM 8 FLUT OVL FOR RAP AGG

## (undated) DEVICE — DRAPE SHEET LG

## (undated) DEVICE — GLOVE ENCR MICRO S87 8.5

## (undated) DEVICE — NEEDLE SPINL CLR HUB 18GX3 1/2

## (undated) DEVICE — SUTURE VCRL 2-0 L27IN ABSRB UD L24MM FS-1 3/8

## (undated) DEVICE — SYRINGE LUER LOCK TIP 3CC

## (undated) DEVICE — SUTURE MCRYL SZ 3-0 L27IN ABSRB UD L24MM PS-1

## (undated) DEVICE — TOURNIQUET SURG W4XL34IN PUR 2 PRT QC SGL

## (undated) DEVICE — KIT INSTR TRANSTIBIAL ACL W/ SAW BLDE DISP

## (undated) DEVICE — PACK CDS LOWER EXTREMITY

## (undated) DEVICE — TUBING PMP L16FT DISP

## (undated) DEVICE — ZZDISC-NO SUB ADHESIVE LIQ 2/3CC TRNSPAR COMPND BENZ TINC

## (undated) DEVICE — SYSTEM SEMI RIG LNR 3000CC W/ EL AND SELF

## (undated) DEVICE — DRESSING ACEWRAP STER 4IN

## (undated) DEVICE — SUTURE FIBERWIRE SZ 2 L38IN NONABSORB BLU

## (undated) DEVICE — BRACE KNEE UPTO 30.5IN FOR 17-27IN THGH UNIV

## (undated) DEVICE — SUTURE COAT VCRL 1 27IN ABSRB VLT 36MM CT-1

## (undated) DEVICE — TUBING SUCT L12FT DIA6MM CLR N CNDTVE W/ MAXI

## (undated) DEVICE — SOLUTION IRRIG 3000ML 0.9% NACL FLX CONT

## (undated) DEVICE — COVER TBL W60XL90IN STD PUNC RESIST LO

## (undated) DEVICE — GLOVE GAMMEX PI HYBRID LF 8.5

## (undated) DEVICE — STRIP SKIN CLSR W0.5XL4IN REINF NINVAS DSGN

## (undated) DEVICE — WAND ARTHSCP SHFT DIA3.75MM TIP DIA5.25MM

## (undated) DEVICE — SOLUTION IRRIG 1000ML 0.9% NACL USP BTL

## (undated) DEVICE — SUTURE COAT VCRL 0 27IN ABSRB UD 36MM CP-1

## (undated) DEVICE — SUTURE ETHBND XL 2 30IN NABSRB GRN 40MM V-37 1/2 CIR

## (undated) DEVICE — BLADE SHV L13CM DIA4MM DBL CUT COOLCUT